# Patient Record
Sex: FEMALE | Race: OTHER | HISPANIC OR LATINO | URBAN - METROPOLITAN AREA
[De-identification: names, ages, dates, MRNs, and addresses within clinical notes are randomized per-mention and may not be internally consistent; named-entity substitution may affect disease eponyms.]

---

## 2022-05-23 ENCOUNTER — INPATIENT (INPATIENT)
Facility: HOSPITAL | Age: 27
LOS: 2 days | Discharge: HOME | End: 2022-05-26
Attending: OBSTETRICS & GYNECOLOGY | Admitting: OBSTETRICS & GYNECOLOGY

## 2022-05-23 VITALS
DIASTOLIC BLOOD PRESSURE: 69 MMHG | HEART RATE: 116 BPM | TEMPERATURE: 99 F | RESPIRATION RATE: 16 BRPM | SYSTOLIC BLOOD PRESSURE: 110 MMHG

## 2022-05-23 DIAGNOSIS — N84.0 POLYP OF CORPUS UTERI: Chronic | ICD-10-CM

## 2022-05-23 DIAGNOSIS — Z3A.00 WEEKS OF GESTATION OF PREGNANCY NOT SPECIFIED: ICD-10-CM

## 2022-05-23 DIAGNOSIS — O26.899 OTHER SPECIFIED PREGNANCY RELATED CONDITIONS, UNSPECIFIED TRIMESTER: ICD-10-CM

## 2022-05-23 DIAGNOSIS — Z98.890 OTHER SPECIFIED POSTPROCEDURAL STATES: Chronic | ICD-10-CM

## 2022-05-23 DIAGNOSIS — O41.00X0 OLIGOHYDRAMNIOS, UNSPECIFIED TRIMESTER, NOT APPLICABLE OR UNSPECIFIED: ICD-10-CM

## 2022-05-23 DIAGNOSIS — J34.2 DEVIATED NASAL SEPTUM: Chronic | ICD-10-CM

## 2022-05-23 LAB
B PERT DNA SPEC QL NAA+PROBE: SIGNIFICANT CHANGE UP
B PERT+PARAPERT DNA PNL SPEC NAA+PROBE: SIGNIFICANT CHANGE UP
BASOPHILS # BLD AUTO: 0.04 K/UL — SIGNIFICANT CHANGE UP (ref 0–0.2)
BASOPHILS NFR BLD AUTO: 0.5 % — SIGNIFICANT CHANGE UP (ref 0–2)
BLD GP AB SCN SERPL QL: NEGATIVE — SIGNIFICANT CHANGE UP
BORDETELLA PARAPERTUSSIS (RAPRVP): SIGNIFICANT CHANGE UP
C PNEUM DNA SPEC QL NAA+PROBE: SIGNIFICANT CHANGE UP
EOSINOPHIL # BLD AUTO: 0.03 K/UL — SIGNIFICANT CHANGE UP (ref 0–0.5)
EOSINOPHIL NFR BLD AUTO: 0.4 % — SIGNIFICANT CHANGE UP (ref 0–6)
FLUAV H1 2009 PAND RNA SPEC QL NAA+PROBE: DETECTED
FLUBV RNA SPEC QL NAA+PROBE: SIGNIFICANT CHANGE UP
HADV DNA SPEC QL NAA+PROBE: SIGNIFICANT CHANGE UP
HCOV 229E RNA SPEC QL NAA+PROBE: SIGNIFICANT CHANGE UP
HCOV HKU1 RNA SPEC QL NAA+PROBE: SIGNIFICANT CHANGE UP
HCOV NL63 RNA SPEC QL NAA+PROBE: SIGNIFICANT CHANGE UP
HCOV OC43 RNA SPEC QL NAA+PROBE: SIGNIFICANT CHANGE UP
HCT VFR BLD CALC: 36.6 % — SIGNIFICANT CHANGE UP (ref 34.5–45)
HGB BLD-MCNC: 11.7 G/DL — SIGNIFICANT CHANGE UP (ref 11.5–15.5)
HMPV RNA SPEC QL NAA+PROBE: SIGNIFICANT CHANGE UP
HPIV1 RNA SPEC QL NAA+PROBE: SIGNIFICANT CHANGE UP
HPIV2 RNA SPEC QL NAA+PROBE: SIGNIFICANT CHANGE UP
HPIV3 RNA SPEC QL NAA+PROBE: SIGNIFICANT CHANGE UP
HPIV4 RNA SPEC QL NAA+PROBE: SIGNIFICANT CHANGE UP
IANC: 6.21 K/UL — SIGNIFICANT CHANGE UP (ref 1.8–7.4)
IMM GRANULOCYTES NFR BLD AUTO: 1 % — SIGNIFICANT CHANGE UP (ref 0–1.5)
LYMPHOCYTES # BLD AUTO: 0.94 K/UL — LOW (ref 1–3.3)
LYMPHOCYTES # BLD AUTO: 11.5 % — LOW (ref 13–44)
M PNEUMO DNA SPEC QL NAA+PROBE: SIGNIFICANT CHANGE UP
MCHC RBC-ENTMCNC: 28.3 PG — SIGNIFICANT CHANGE UP (ref 27–34)
MCHC RBC-ENTMCNC: 32 GM/DL — SIGNIFICANT CHANGE UP (ref 32–36)
MCV RBC AUTO: 88.4 FL — SIGNIFICANT CHANGE UP (ref 80–100)
MONOCYTES # BLD AUTO: 0.9 K/UL — SIGNIFICANT CHANGE UP (ref 0–0.9)
MONOCYTES NFR BLD AUTO: 11 % — SIGNIFICANT CHANGE UP (ref 2–14)
NEUTROPHILS # BLD AUTO: 6.21 K/UL — SIGNIFICANT CHANGE UP (ref 1.8–7.4)
NEUTROPHILS NFR BLD AUTO: 75.6 % — SIGNIFICANT CHANGE UP (ref 43–77)
NRBC # BLD: 0 /100 WBCS — SIGNIFICANT CHANGE UP
NRBC # FLD: 0 K/UL — SIGNIFICANT CHANGE UP
PLATELET # BLD AUTO: 179 K/UL — SIGNIFICANT CHANGE UP (ref 150–400)
RAPID RVP RESULT: DETECTED
RBC # BLD: 4.14 M/UL — SIGNIFICANT CHANGE UP (ref 3.8–5.2)
RBC # FLD: 14.8 % — HIGH (ref 10.3–14.5)
RH IG SCN BLD-IMP: POSITIVE — SIGNIFICANT CHANGE UP
RH IG SCN BLD-IMP: POSITIVE — SIGNIFICANT CHANGE UP
RSV RNA SPEC QL NAA+PROBE: SIGNIFICANT CHANGE UP
RV+EV RNA SPEC QL NAA+PROBE: SIGNIFICANT CHANGE UP
SARS-COV-2 RNA SPEC QL NAA+PROBE: SIGNIFICANT CHANGE UP
WBC # BLD: 8.2 K/UL — SIGNIFICANT CHANGE UP (ref 3.8–10.5)
WBC # FLD AUTO: 8.2 K/UL — SIGNIFICANT CHANGE UP (ref 3.8–10.5)

## 2022-05-23 RX ORDER — SODIUM CHLORIDE 9 MG/ML
1000 INJECTION, SOLUTION INTRAVENOUS
Refills: 0 | Status: DISCONTINUED | OUTPATIENT
Start: 2022-05-23 | End: 2022-05-24

## 2022-05-23 RX ORDER — OXYTOCIN 10 UNIT/ML
333.33 VIAL (ML) INJECTION
Qty: 20 | Refills: 0 | Status: DISCONTINUED | OUTPATIENT
Start: 2022-05-23 | End: 2022-05-24

## 2022-05-23 RX ADMIN — SODIUM CHLORIDE 125 MILLILITER(S): 9 INJECTION, SOLUTION INTRAVENOUS at 20:17

## 2022-05-23 NOTE — OB PROVIDER H&P - ASSESSMENT
26 year old female P0 at 40 weeks gestation  OLIGO  SIMONA 3.4 for IOL with Buccal Cytotec    c/o COVID 19 s/s  ( body aches fever)  for r/o Covid   RVP sent     case d/w Dr Sondra Max PA  26 year old female P0 at 40 weeks gestation  OLIGO  SIMONA 3.4 for IOL with Buccal Cytotec    c/o COVID 19 s/s  ( body aches fever)  for r/o Covid   RVP sent.      case d/w Dr Sondra Max PA

## 2022-05-23 NOTE — OB PROVIDER TRIAGE NOTE - NSHPPHYSICALEXAM_GEN_ALL_CORE
pt seen and examined    ICU Vital Signs Last 24 Hrs  T(C): 37.0 (23 May 2022 17:56), Max: 37 (23 May 2022 17:34)  T(F): 98.6 (23 May 2022 17:56), Max: 98.6 (23 May 2022 17:34)  HR: 108 (23 May 2022 18:28) (103 - 119)  BP: 110/70 (23 May 2022 18:16) (110/69 - 110/70)  BP(mean): --  ABP: --  ABP(mean): --  RR: 16 (23 May 2022 17:34) (16 - 16)  SpO2: 98% (23 May 2022 18:28) (93% - 98%)  pt alert OX3   lungs clear   heart s1 s2   abd soft  gravid nontender   placed on EFM

## 2022-05-23 NOTE — OB RN PATIENT PROFILE - NS_RELATIONSHIPOFSUPPORTPERSON_OBGYN_ALL_OB_FT
Partner Pt reports pain and stiffness in his legs and back. He feels his pain is controlled with Tylenol and Motrin. He was advised not to take meloxicam if taking Motrin; he stated he was aware. He reports his stiffness worsens throughout the day. He has difficulty getting off the sofa and needs assistance. He has difficulty getting in and out of the car. Please advise. Pharmacy verified. Tasked to on call provider.

## 2022-05-23 NOTE — OB RN PATIENT PROFILE - SPIRITUAL CULTURAL, CURRENT SITUATION, PROFILE
Writer spoke with Sandy Mcgill again at Dr. Steph Juarez and she states she will send the lab orders in his paperwork packet and he can have them done here at [facility] then fax results to Yas fax [phone]. No

## 2022-05-23 NOTE — OB PROVIDER H&P - HISTORY OF PRESENT ILLNESS
26 year old female P0 at 40 weeks gestation who started feeling body aches fever 100.8 at 2p at home  and mild cramping  took Tylenol 100mgs at 2p    denied any LOF VB  feels gfm    denied any ap issues denied any fetal issues     med hx anemia  surghx Deviated septum repair  2013              L bunionectomy 2015                uterine poly removed  2015   meds pnvqd   allergies  NKDA   OB hx  P0   gyn hx denied        26 year old female P0 at 40 weeks gestation who started feeling body aches fever 100.8 at 2p at home  and mild cramping  took Tylenol 100mgs at 2p    denied any LOF VB  feels gfm    denied any ap issues denied any fetal issues   stated was vaccinated  no sick contacts          med hx anemia  surghx Deviated septum repair  2013              L bunionectomy 2015                uterine poly removed  2015   meds pnvqd   allergies  NKDA   OB hx  P0   gyn hx denied

## 2022-05-23 NOTE — OB RN TRIAGE NOTE - FALL HARM RISK - UNIVERSAL INTERVENTIONS
Bed in lowest position, wheels locked, appropriate side rails in place/Call bell, personal items and telephone in reach/Instruct patient to call for assistance before getting out of bed or chair/Non-slip footwear when patient is out of bed/[place] to call system/Physically safe environment - no spills, clutter or unnecessary equipment/Purposeful Proactive Rounding/Room/bathroom lighting operational, light cord in reach

## 2022-05-23 NOTE — OB RN TRIAGE NOTE - NSICDXPASTSURGICALHX_GEN_ALL_CORE_FT
PAST SURGICAL HISTORY:  Deviated septum 2013    History of bunionectomy 2015 left    Uterine polyp removed 2015

## 2022-05-23 NOTE — OB RN PATIENT PROFILE - FALL HARM RISK - UNIVERSAL INTERVENTIONS
Bed in lowest position, wheels locked, appropriate side rails in place/Call bell, personal items and telephone in reach/Instruct patient to call for assistance before getting out of bed or chair/Non-slip footwear when patient is out of bed/[place] to call system/Physically safe environment - no spills, clutter or unnecessary equipment/Purposeful Proactive Rounding/Room/bathroom lighting operational, light cord in reach

## 2022-05-23 NOTE — OB PROVIDER TRIAGE NOTE - HISTORY OF PRESENT ILLNESS
26 year old female P0 at 40 weeks gestation who started feeling body aches fever 100.8 at 2p at home  and mild cramping  took Tylenol 100mgs at 2p    denied any LOF VB  feels gfm    denied any ap issues denied any fetal issues     med hx anemia  surghx Deviated septum repair  2013              L bunionectomy 2015                uterine poly removed  2015   meds pnvqd   allergies  NKDA   OB hx  P0   gyn hx denied

## 2022-05-23 NOTE — OB PROVIDER TRIAGE NOTE - NSOBPROVIDERNOTE_OBGYN_ALL_OB_FT
26 year old female P0 at 40 weeks  Covid s/s   r/o Covid 26 year old female P0 at 40 weeks  OLIGO   SIMONA 3.4   for IOL with buccal Cytotec    c/o COVID  s/s ( body aches fever     case d/w Dr carver    admitted   COVID 19  RVP done 26 year old female P0 at 40 weeks  OLIGO   SIMONA 3.4   for IOL with buccal Cytotec    c/o COVID  s/s ( body aches fever )  r/o  COVID  19      case d/w Dr carver    admitted   COVID 19  RVP done    Mansoor SANCHEZ

## 2022-05-24 ENCOUNTER — TRANSCRIPTION ENCOUNTER (OUTPATIENT)
Age: 27
End: 2022-05-24

## 2022-05-24 DIAGNOSIS — O41.00X0 OLIGOHYDRAMNIOS, UNSPECIFIED TRIMESTER, NOT APPLICABLE OR UNSPECIFIED: ICD-10-CM

## 2022-05-24 LAB
COVID-19 SPIKE DOMAIN AB INTERP: POSITIVE
COVID-19 SPIKE DOMAIN ANTIBODY RESULT: >250 U/ML — HIGH
SARS-COV-2 IGG+IGM SERPL QL IA: >250 U/ML — HIGH
SARS-COV-2 IGG+IGM SERPL QL IA: POSITIVE
T PALLIDUM AB TITR SER: NEGATIVE — SIGNIFICANT CHANGE UP

## 2022-05-24 RX ORDER — DIPHENHYDRAMINE HCL 50 MG
25 CAPSULE ORAL EVERY 6 HOURS
Refills: 0 | Status: DISCONTINUED | OUTPATIENT
Start: 2022-05-24 | End: 2022-05-26

## 2022-05-24 RX ORDER — ACETAMINOPHEN 500 MG
975 TABLET ORAL
Refills: 0 | Status: DISCONTINUED | OUTPATIENT
Start: 2022-05-24 | End: 2022-05-26

## 2022-05-24 RX ORDER — IBUPROFEN 200 MG
600 TABLET ORAL EVERY 6 HOURS
Refills: 0 | Status: COMPLETED | OUTPATIENT
Start: 2022-05-24 | End: 2023-04-22

## 2022-05-24 RX ORDER — BENZOCAINE 10 %
1 GEL (GRAM) MUCOUS MEMBRANE EVERY 6 HOURS
Refills: 0 | Status: DISCONTINUED | OUTPATIENT
Start: 2022-05-24 | End: 2022-05-26

## 2022-05-24 RX ORDER — DIBUCAINE 1 %
1 OINTMENT (GRAM) RECTAL EVERY 6 HOURS
Refills: 0 | Status: DISCONTINUED | OUTPATIENT
Start: 2022-05-24 | End: 2022-05-26

## 2022-05-24 RX ORDER — SIMETHICONE 80 MG/1
80 TABLET, CHEWABLE ORAL EVERY 4 HOURS
Refills: 0 | Status: DISCONTINUED | OUTPATIENT
Start: 2022-05-24 | End: 2022-05-26

## 2022-05-24 RX ORDER — SODIUM CHLORIDE 9 MG/ML
3 INJECTION INTRAMUSCULAR; INTRAVENOUS; SUBCUTANEOUS EVERY 8 HOURS
Refills: 0 | Status: DISCONTINUED | OUTPATIENT
Start: 2022-05-24 | End: 2022-05-26

## 2022-05-24 RX ORDER — OXYTOCIN 10 UNIT/ML
2 VIAL (ML) INJECTION
Qty: 30 | Refills: 0 | Status: DISCONTINUED | OUTPATIENT
Start: 2022-05-24 | End: 2022-05-26

## 2022-05-24 RX ORDER — LANOLIN
1 OINTMENT (GRAM) TOPICAL EVERY 6 HOURS
Refills: 0 | Status: DISCONTINUED | OUTPATIENT
Start: 2022-05-24 | End: 2022-05-26

## 2022-05-24 RX ORDER — MAGNESIUM HYDROXIDE 400 MG/1
30 TABLET, CHEWABLE ORAL
Refills: 0 | Status: DISCONTINUED | OUTPATIENT
Start: 2022-05-24 | End: 2022-05-26

## 2022-05-24 RX ORDER — OXYCODONE HYDROCHLORIDE 5 MG/1
5 TABLET ORAL
Refills: 0 | Status: DISCONTINUED | OUTPATIENT
Start: 2022-05-24 | End: 2022-05-26

## 2022-05-24 RX ORDER — OXYTOCIN 10 UNIT/ML
333.33 VIAL (ML) INJECTION
Qty: 20 | Refills: 0 | Status: DISCONTINUED | OUTPATIENT
Start: 2022-05-24 | End: 2022-05-26

## 2022-05-24 RX ORDER — OXYCODONE HYDROCHLORIDE 5 MG/1
5 TABLET ORAL ONCE
Refills: 0 | Status: DISCONTINUED | OUTPATIENT
Start: 2022-05-24 | End: 2022-05-26

## 2022-05-24 RX ORDER — TETANUS TOXOID, REDUCED DIPHTHERIA TOXOID AND ACELLULAR PERTUSSIS VACCINE, ADSORBED 5; 2.5; 8; 8; 2.5 [IU]/.5ML; [IU]/.5ML; UG/.5ML; UG/.5ML; UG/.5ML
0.5 SUSPENSION INTRAMUSCULAR ONCE
Refills: 0 | Status: DISCONTINUED | OUTPATIENT
Start: 2022-05-24 | End: 2022-05-26

## 2022-05-24 RX ORDER — PRAMOXINE HYDROCHLORIDE 150 MG/15G
1 AEROSOL, FOAM RECTAL EVERY 4 HOURS
Refills: 0 | Status: DISCONTINUED | OUTPATIENT
Start: 2022-05-24 | End: 2022-05-26

## 2022-05-24 RX ORDER — AER TRAVELER 0.5 G/1
1 SOLUTION RECTAL; TOPICAL EVERY 4 HOURS
Refills: 0 | Status: DISCONTINUED | OUTPATIENT
Start: 2022-05-24 | End: 2022-05-26

## 2022-05-24 RX ORDER — KETOROLAC TROMETHAMINE 30 MG/ML
30 SYRINGE (ML) INJECTION ONCE
Refills: 0 | Status: DISCONTINUED | OUTPATIENT
Start: 2022-05-24 | End: 2022-05-24

## 2022-05-24 RX ORDER — BUTORPHANOL TARTRATE 2 MG/ML
2 INJECTION, SOLUTION INTRAMUSCULAR; INTRAVENOUS ONCE
Refills: 0 | Status: DISCONTINUED | OUTPATIENT
Start: 2022-05-24 | End: 2022-05-24

## 2022-05-24 RX ORDER — HYDROCORTISONE 1 %
1 OINTMENT (GRAM) TOPICAL EVERY 6 HOURS
Refills: 0 | Status: DISCONTINUED | OUTPATIENT
Start: 2022-05-24 | End: 2022-05-26

## 2022-05-24 RX ADMIN — Medication 1000 MILLIUNIT(S)/MIN: at 23:31

## 2022-05-24 RX ADMIN — Medication 30 MILLIGRAM(S): at 23:31

## 2022-05-24 RX ADMIN — Medication 75 MILLIGRAM(S): at 12:06

## 2022-05-24 RX ADMIN — Medication 2 MILLIUNIT(S)/MIN: at 17:47

## 2022-05-24 NOTE — OB PROVIDER LABOR PROGRESS NOTE - NS_OBIHIFHRDETAILS_OBGYN_ALL_OB_FT
140/min/(-)accels/(-)decels
140/mod variability/-accels/-decels
130s, moderate variability, accels, no decels
150s, moderate variability, accels, no decels

## 2022-05-24 NOTE — OB PROVIDER DELIVERY SUMMARY - NSPROVIDERDELIVERYNOTE_OBGYN_ALL_OB_FT
Attending Note   Pt progressed to 10/100/+2   Delivered vertex over intact perineum followed by the body and shoulders  Delayed cord clamping performed  Placenta delivered intact  Uterine atony noted and resolved with double pitocin and massage  Cavity noted to be empty   Vagina, rectum and cervix inspected  2nd degree laceration noted and repaired in usual fashion   rectal exam intact after delivery   Darcy Rolon MD Attending Note     Pt progressed to 10/100/+2   Delivered vertex over intact perineum followed by the body and shoulders  Delayed cord clamping performed  Placenta delivered intact  Uterine atony noted and resolved with double pitocin and massage  Cavity noted to be empty   Vagina, rectum and cervix inspected  2nd degree laceration noted and repaired in usual fashion   rectal exam intact after delivery   Girl Darcy Rolon MD

## 2022-05-24 NOTE — OB PROVIDER DELIVERY SUMMARY - NSSELHIDDEN_OBGYN_ALL_OB_FT
[NS_DeliveryAttending2_OBGYN_ALL_OB_FT:[email or web address]],[NS_DeliveryAssist1_OBGYN_ALL_OB_FT:[id number]=],[NS_DeliveryAttending1_OBGYN_ALL_OB_FT:[email or web address]=]

## 2022-05-24 NOTE — DISCHARGE NOTE OB - MEDICATION SUMMARY - MEDICATIONS TO TAKE
I will START or STAY ON the medications listed below when I get home from the hospital:    ibuprofen 600 mg oral tablet  -- 1 tab(s) by mouth every 6 hours  -- Indication: For pain    acetaminophen 325 mg oral tablet  -- 2 tab(s) by mouth every 4 hours, As Needed  -- Indication: For pain     oseltamivir 75 mg oral capsule  -- 1 cap(s) by mouth 2 times a day MDD:2  -- Indication: For flu    Prena1 oral capsule  -- 1 cap(s) by mouth once a day  -- Indication: For postpartum

## 2022-05-24 NOTE — OB PROVIDER LABOR PROGRESS NOTE - NS_SUBJECTIVE/OBJECTIVE_OBGYN_ALL_OB_FT
VE due to pt feeling increased pain and pressure
VE to evaluate progress in labor
PGY1 Labor & Delivery Progress Note     Pt examined @0907 for CRB placement    T(C): 37.0 (05-24-22 @ 04:00), Max: 37.3 (05-23-22 @ 22:00)  HR: 98 (05-24-22 @ 09:24) (81 - 125)  BP: 115/73 (05-24-22 @ 08:02) (99/58 - 140/82)  RR: 14 (05-24-22 @ 04:00) (14 - 16)  SpO2: 98% (05-24-22 @ 09:24) (92% - 100%)
pt seen and examined at bedside

## 2022-05-24 NOTE — OB PROVIDER LABOR PROGRESS NOTE - ASSESSMENT
25 y/o  at 40/1 IOL for oligo SIMONA 4  - s/p cervical balloon   - plan for pitocin when able  - cont with EFM/toco  - d/w DR Alvarado- [place]

## 2022-05-24 NOTE — DISCHARGE NOTE OB - PATIENT PORTAL LINK FT
You can access the FollowMyHealth Patient Portal offered by [facility] by registering at the following website: [email or web address]. By joining [facility]’s FollowMyHealth portal, you will also be able to view your health information using other applications (apps) compatible with our system.

## 2022-05-24 NOTE — OB PROVIDER LABOR PROGRESS NOTE - ASSESSMENT
01/18/17 1200   Referral Source   Referral Source High dollar   Plan   Plan Epic chart reviewed   Case Status   Case Status Closed - no opportunity   Chart reviewed by Onsite Nurse  for high dollar intervention initiative.   PLAN:  Will not reach out to patient based on Epic chart review.  Patient is receiving the necessary care and has care established with the appropriate providers.   Patient has established care with a PCP.  Sarah Forte, RN, BS, [facility]   Jackie Onsite Nurse   Jackie Caregivers for last names A - D  T [phone]   - c/w buccal cytotec    Pascale Watkins, PGY1  d/w Dr. Amaro

## 2022-05-24 NOTE — OB RN DELIVERY SUMMARY - NSSELHIDDEN_OBGYN_ALL_OB_FT
[NS_DeliveryAttending1_OBGYN_ALL_OB_FT:[email or web address]=],[NS_DeliveryAssist1_OBGYN_ALL_OB_FT:[id number]=],[NS_DeliveryRN_OBGYN_ALL_OB_FT:[id number]=]

## 2022-05-24 NOTE — OB RN DELIVERY SUMMARY - NS_SEPSISRSKCALC_OBGYN_ALL_OB_FT
EOS calculated successfully. EOS Risk Factor: 0.5/1000 live births ([facility] national incidence); GA=40w1d; Temp=99.14; ROM=1.7; GBS='Negative'; Antibiotics='No antibiotics or any antibiotics < 2 hrs prior to birth'

## 2022-05-24 NOTE — OB PROVIDER LABOR PROGRESS NOTE - ASSESSMENT
- c/w pitocin   - anesthesia called for top off   - anticipate     Pascale Watkins, PGY1  d/w Dr. Christiano Stephens  - c/w pitocin   - anesthesia called for top off   - anticipate     Pascale Watkins, PGY1  d/w Dr. Christiano Stephens     Attending Note   Pt seen and examined   agree with above     R Gonzales LEVI

## 2022-05-24 NOTE — OB PROVIDER LABOR PROGRESS NOTE - ASSESSMENT
A/P:   26y  @ 40.1wga admitted for IOL 2/2 to Oligo    #Labor   - c/w buccal Misoprostol  - CRB placed. Patient tolerated the procedure well. Vaginal and uterine balloons were filled with 60cc of NS each     #Fetal Wellbeing   - Cat 1    # Issues   - To start Tamiflu for Influenza (+)    #Pain Control   - Epidural, IV, and/or PO PRN    Sonu Khanna, PGY-1    d/w Dr. Amaro

## 2022-05-24 NOTE — DISCHARGE NOTE OB - CARE PLAN
Principal Discharge DX:	Normal vaginal delivery  Assessment and plan of treatment:	nothing in the vagina x 6 weeks  Secondary Diagnosis:	Influenza  Assessment and plan of treatment:	continue tamiflu   1

## 2022-05-24 NOTE — DISCHARGE NOTE OB - MATERIALS PROVIDED
Vaccinations/[facility]  Screening Program/  Immunization Record/Breastfeeding Log/Breastfeeding Mother’s Support Group Information/Guide to Postpartum Care/[facility] Hearing Screen Program/Back To Sleep Handout/Shaken Baby Prevention Handout/Breastfeeding Guide and Packet/Birth Certificate Instructions/Discharge Medication Information for Patients and Families Pocket Guide

## 2022-05-24 NOTE — DISCHARGE NOTE OB - CARE PROVIDER_API CALL
Latoya Amaro)  [place] [address]  Phone: [phone]  Fax: [phone]  Follow Up Time:

## 2022-05-24 NOTE — DISCHARGE NOTE OB - NS MD DC FALL RISK RISK
For information on Fall & Injury Prevention, visit: [email or web address] OR  [email or web address] OR  https://www.cdc.gov/steadi/patient.html

## 2022-05-25 RX ORDER — SODIUM CHLORIDE 9 MG/ML
500 INJECTION, SOLUTION INTRAVENOUS ONCE
Refills: 0 | Status: COMPLETED | OUTPATIENT
Start: 2022-05-25 | End: 2022-05-25

## 2022-05-25 RX ORDER — IBUPROFEN 200 MG
600 TABLET ORAL EVERY 6 HOURS
Refills: 0 | Status: DISCONTINUED | OUTPATIENT
Start: 2022-05-25 | End: 2022-05-26

## 2022-05-25 RX ORDER — SODIUM CHLORIDE 9 MG/ML
1000 INJECTION, SOLUTION INTRAVENOUS ONCE
Refills: 0 | Status: COMPLETED | OUTPATIENT
Start: 2022-05-25 | End: 2022-05-25

## 2022-05-25 RX ADMIN — Medication 600 MILLIGRAM(S): at 22:55

## 2022-05-25 RX ADMIN — Medication 30 MILLIGRAM(S): at 00:14

## 2022-05-25 RX ADMIN — SODIUM CHLORIDE 3 MILLILITER(S): 9 INJECTION INTRAMUSCULAR; INTRAVENOUS; SUBCUTANEOUS at 06:33

## 2022-05-25 RX ADMIN — Medication 75 MILLIGRAM(S): at 18:54

## 2022-05-25 RX ADMIN — Medication 600 MILLIGRAM(S): at 22:08

## 2022-05-25 RX ADMIN — Medication 975 MILLIGRAM(S): at 18:34

## 2022-05-25 RX ADMIN — MAGNESIUM HYDROXIDE 30 MILLILITER(S): 400 TABLET, CHEWABLE ORAL at 23:24

## 2022-05-25 RX ADMIN — Medication 600 MILLIGRAM(S): at 13:55

## 2022-05-25 RX ADMIN — Medication 975 MILLIGRAM(S): at 02:21

## 2022-05-25 RX ADMIN — Medication 975 MILLIGRAM(S): at 02:59

## 2022-05-25 RX ADMIN — Medication 75 MILLIGRAM(S): at 05:53

## 2022-05-25 RX ADMIN — SODIUM CHLORIDE 3 MILLILITER(S): 9 INJECTION INTRAMUSCULAR; INTRAVENOUS; SUBCUTANEOUS at 13:42

## 2022-05-25 RX ADMIN — SODIUM CHLORIDE 1000 MILLILITER(S): 9 INJECTION, SOLUTION INTRAVENOUS at 06:33

## 2022-05-25 RX ADMIN — SODIUM CHLORIDE 2000 MILLILITER(S): 9 INJECTION, SOLUTION INTRAVENOUS at 00:05

## 2022-05-25 RX ADMIN — Medication 600 MILLIGRAM(S): at 13:09

## 2022-05-25 RX ADMIN — SIMETHICONE 80 MILLIGRAM(S): 80 TABLET, CHEWABLE ORAL at 23:25

## 2022-05-25 RX ADMIN — Medication 600 MILLIGRAM(S): at 05:53

## 2022-05-25 RX ADMIN — Medication 600 MILLIGRAM(S): at 06:40

## 2022-05-25 RX ADMIN — Medication 975 MILLIGRAM(S): at 19:20

## 2022-05-25 NOTE — PROVIDER CONTACT NOTE (OTHER) - ASSESSMENT
133/75 HR  95  123/76   129/83   Patient reports feeling lightheaded upon standing   Fundus firm and at umbilicus with light bleeding

## 2022-05-25 NOTE — PROVIDER CONTACT NOTE (OTHER) - SITUATION
patient positive for orthostatics. laying 118/78 HR 77 standing 91/60 
MD Notified for patient feeling lightheaded when standing after ortho BP

## 2022-05-25 NOTE — PROVIDER CONTACT NOTE (OTHER) - ACTION/TREATMENT ORDERED:
1L bolus to be given
500cc LR bolus to run over 30 min, encourage oral fluids and repeat orthos in AM.

## 2022-05-25 NOTE — PROVIDER CONTACT NOTE (OTHER) - ASSESSMENT
patient states that she feels lightheaded when standing. denies dizziness, headache, heart palpitations, or vision changes. patient also denies n/v. states that she feels tired and hasn't slept since monday.

## 2022-05-25 NOTE — PROGRESS NOTE ADULT - ASSESSMENT
POD #1 s/p vaginal delivery. Patient is doing well, ambulating, voiding, tolerating diet. Denies nausea and vomiting. Pain is tolerable. No residual anesthetic issues or complications noted.     Blanca Hammond CRNA

## 2022-05-26 VITALS
RESPIRATION RATE: 18 BRPM | DIASTOLIC BLOOD PRESSURE: 63 MMHG | SYSTOLIC BLOOD PRESSURE: 120 MMHG | HEART RATE: 91 BPM | OXYGEN SATURATION: 100 % | TEMPERATURE: 98 F

## 2022-05-26 RX ORDER — ACETAMINOPHEN 500 MG
3 TABLET ORAL
Qty: 0 | Refills: 0 | DISCHARGE
Start: 2022-05-26

## 2022-05-26 RX ORDER — ACETAMINOPHEN 500 MG
2 TABLET ORAL
Qty: 0 | Refills: 0 | DISCHARGE
Start: 2022-05-26

## 2022-05-26 RX ORDER — IBUPROFEN 200 MG
1 TABLET ORAL
Qty: 0 | Refills: 0 | DISCHARGE
Start: 2022-05-26

## 2022-05-26 RX ADMIN — Medication 75 MILLIGRAM(S): at 05:24

## 2022-05-26 RX ADMIN — Medication 600 MILLIGRAM(S): at 05:24

## 2022-05-26 RX ADMIN — Medication 600 MILLIGRAM(S): at 06:01

## 2022-05-26 RX ADMIN — Medication 600 MILLIGRAM(S): at 11:55

## 2022-05-26 RX ADMIN — Medication 600 MILLIGRAM(S): at 12:20

## 2023-08-11 NOTE — OB PROVIDER H&P - NSHPPHYSICALEXAM_GEN_ALL_CORE
- - - pt seen and examined   ICU Vital Signs Last 24 Hrs  T(C): 37.0 (23 May 2022 17:56), Max: 37 (23 May 2022 17:34)  T(F): 98.6 (23 May 2022 17:56), Max: 98.6 (23 May 2022 17:34)  HR: 119 (23 May 2022 19:08) (103 - 123)  BP: 130/82 (23 May 2022 18:56) (105/61 - 130/82)  BP(mean): --  ABP: --  ABP(mean): --  RR: 16 (23 May 2022 17:34) (16 - 16)  SpO2: 96% (23 May 2022 19:08) (93% - 98%)  pt alert OX3   lungs clear   heart s1 s2   abd soft  VE 2/50/-3  vtx   scan vertex SIMONA 3.4 pt seen and examined   ICU Vital Signs Last 24 Hrs  T(C): 37.0 (23 May 2022 17:56), Max: 37 (23 May 2022 17:34)  T(F): 98.6 (23 May 2022 17:56), Max: 98.6 (23 May 2022 17:34)  HR: 119 (23 May 2022 19:08) (103 - 123)  BP: 130/82 (23 May 2022 18:56) (105/61 - 130/82)  RR: 16 (23 May 2022 17:34) (16 - 16)  SpO2: 96% (23 May 2022 19:08) (93% - 98%)  pt alert OX3   lungs clear   heart s1 s2   abd soft  VE 2/50/-3  vtx   Nitz negative   scan vertex SIMONA 3.4 BPP 6/8 posterior placenta

## 2024-02-20 PROBLEM — D64.9 ANEMIA, UNSPECIFIED: Chronic | Status: ACTIVE | Noted: 2022-05-23

## 2024-02-26 PROBLEM — Z00.00 ENCOUNTER FOR PREVENTIVE HEALTH EXAMINATION: Status: ACTIVE | Noted: 2024-02-26

## 2024-03-05 ENCOUNTER — ASOB RESULT (OUTPATIENT)
Age: 29
End: 2024-03-05

## 2024-03-05 ENCOUNTER — APPOINTMENT (OUTPATIENT)
Dept: ANTEPARTUM | Facility: CLINIC | Age: 29
End: 2024-03-05
Payer: COMMERCIAL

## 2024-03-05 PROCEDURE — 76801 OB US < 14 WKS SINGLE FETUS: CPT | Mod: 59

## 2024-03-05 PROCEDURE — 76813 OB US NUCHAL MEAS 1 GEST: CPT

## 2024-03-08 NOTE — OB RN PATIENT PROFILE - NSCTXPAIN_OBGYN_ALL_OB
This patient has been assessed with a concern for Malnutrition and has been determined to have a diagnosis/diagnoses of Severe protein-calorie malnutrition.    This patient is being managed with:   Diet NPO after Midnight-     NPO Start Date: 03-Mar-2024   NPO Start Time: 23:59  Entered: Mar  3 2024  7:45AM    Diet Consistent Carbohydrate/No Snacks-  Supplement Feeding Modality:  Oral  Ensure Max Cans or Servings Per Day:  1       Frequency:  Two Times a day  Entered: Mar  2 2024  4:30PM   This patient has been assessed with a concern for Malnutrition and has been determined to have a diagnosis/diagnoses of Severe protein-calorie malnutrition.    This patient is being managed with:   Diet Consistent Carbohydrate/No Snacks-  Supplement Feeding Modality:  Oral  Ensure Max Cans or Servings Per Day:  1       Frequency:  Two Times a day  Entered: Mar  2 2024  4:30PM   This patient has been assessed with a concern for Malnutrition and has been determined to have a diagnosis/diagnoses of Severe protein-calorie malnutrition.    This patient is being managed with:   Diet Consistent Carbohydrate/No Snacks-  Liquid Protein Supplement     Qty per Day:  2  Entered: Mar  6 2024  1:27PM   This patient has been assessed with a concern for Malnutrition and has been determined to have a diagnosis/diagnoses of Severe protein-calorie malnutrition.    This patient is being managed with:   Diet Consistent Carbohydrate/No Snacks-  Supplement Feeding Modality:  Oral  Ensure Max Cans or Servings Per Day:  1       Frequency:  Two Times a day  Entered: Mar  4 2024 11:29AM   This patient has been assessed with a concern for Malnutrition and has been determined to have a diagnosis/diagnoses of Severe protein-calorie malnutrition.    This patient is being managed with:   Diet Consistent Carbohydrate/No Snacks-  Liquid Protein Supplement     Qty per Day:  2  Entered: Mar  6 2024  1:27PM   This patient has been assessed with a concern for Malnutrition and has been determined to have a diagnosis/diagnoses of Severe protein-calorie malnutrition.    This patient is being managed with:   Diet Consistent Carbohydrate/No Snacks-  Supplement Feeding Modality:  Oral  Ensure Max Cans or Servings Per Day:  1       Frequency:  Two Times a day  Entered: Mar  4 2024 11:29AM   Yes

## 2024-03-13 ENCOUNTER — TRANSCRIPTION ENCOUNTER (OUTPATIENT)
Age: 29
End: 2024-03-13

## 2024-03-29 ENCOUNTER — APPOINTMENT (OUTPATIENT)
Dept: ULTRASOUND IMAGING | Facility: CLINIC | Age: 29
End: 2024-03-29

## 2024-04-23 ENCOUNTER — OUTPATIENT (OUTPATIENT)
Dept: OUTPATIENT SERVICES | Facility: HOSPITAL | Age: 29
LOS: 1 days | End: 2024-04-23
Payer: COMMERCIAL

## 2024-04-23 ENCOUNTER — APPOINTMENT (OUTPATIENT)
Dept: ULTRASOUND IMAGING | Facility: CLINIC | Age: 29
End: 2024-04-23
Payer: COMMERCIAL

## 2024-04-23 DIAGNOSIS — Z00.8 ENCOUNTER FOR OTHER GENERAL EXAMINATION: ICD-10-CM

## 2024-04-23 DIAGNOSIS — N84.0 POLYP OF CORPUS UTERI: Chronic | ICD-10-CM

## 2024-04-23 DIAGNOSIS — Z98.890 OTHER SPECIFIED POSTPROCEDURAL STATES: Chronic | ICD-10-CM

## 2024-04-23 DIAGNOSIS — J34.2 DEVIATED NASAL SEPTUM: Chronic | ICD-10-CM

## 2024-04-23 PROCEDURE — 76642 ULTRASOUND BREAST LIMITED: CPT | Mod: 26,LT

## 2024-04-29 ENCOUNTER — APPOINTMENT (OUTPATIENT)
Dept: ANTEPARTUM | Facility: CLINIC | Age: 29
End: 2024-04-29
Payer: COMMERCIAL

## 2024-04-29 ENCOUNTER — ASOB RESULT (OUTPATIENT)
Age: 29
End: 2024-04-29

## 2024-04-29 PROCEDURE — 76811 OB US DETAILED SNGL FETUS: CPT

## 2024-08-22 ENCOUNTER — ASOB RESULT (OUTPATIENT)
Age: 29
End: 2024-08-22

## 2024-08-22 ENCOUNTER — APPOINTMENT (OUTPATIENT)
Dept: ANTEPARTUM | Facility: CLINIC | Age: 29
End: 2024-08-22

## 2024-08-22 PROCEDURE — 76816 OB US FOLLOW-UP PER FETUS: CPT

## 2024-08-22 PROCEDURE — 76819 FETAL BIOPHYS PROFIL W/O NST: CPT | Mod: 59

## 2024-08-22 PROCEDURE — 99202 OFFICE O/P NEW SF 15 MIN: CPT | Mod: 25

## 2024-09-05 ENCOUNTER — INPATIENT (INPATIENT)
Facility: HOSPITAL | Age: 29
LOS: 2 days | Discharge: HOME | End: 2024-09-08
Attending: OBSTETRICS & GYNECOLOGY | Admitting: OBSTETRICS & GYNECOLOGY

## 2024-09-05 VITALS — OXYGEN SATURATION: 98 % | HEART RATE: 80 BPM

## 2024-09-05 DIAGNOSIS — N84.0 POLYP OF CORPUS UTERI: Chronic | ICD-10-CM

## 2024-09-05 DIAGNOSIS — J34.2 DEVIATED NASAL SEPTUM: Chronic | ICD-10-CM

## 2024-09-05 DIAGNOSIS — Z98.890 OTHER SPECIFIED POSTPROCEDURAL STATES: Chronic | ICD-10-CM

## 2024-09-05 DIAGNOSIS — O32.0XX0 MATERNAL CARE FOR UNSTABLE LIE, NOT APPLICABLE OR UNSPECIFIED: ICD-10-CM

## 2024-09-05 LAB
BASOPHILS # BLD AUTO: 0.03 K/UL — SIGNIFICANT CHANGE UP (ref 0–0.2)
BASOPHILS NFR BLD AUTO: 0.5 % — SIGNIFICANT CHANGE UP (ref 0–2)
BLD GP AB SCN SERPL QL: NEGATIVE — SIGNIFICANT CHANGE UP
EOSINOPHIL # BLD AUTO: 0.06 K/UL — SIGNIFICANT CHANGE UP (ref 0–0.5)
EOSINOPHIL NFR BLD AUTO: 0.9 % — SIGNIFICANT CHANGE UP (ref 0–6)
HCT VFR BLD CALC: 31.3 % — LOW (ref 34.5–45)
HGB BLD-MCNC: 9.8 G/DL — LOW (ref 11.5–15.5)
IANC: 4.12 K/UL — SIGNIFICANT CHANGE UP (ref 1.8–7.4)
IMM GRANULOCYTES NFR BLD AUTO: 0.3 % — SIGNIFICANT CHANGE UP (ref 0–0.9)
LYMPHOCYTES # BLD AUTO: 1.63 K/UL — SIGNIFICANT CHANGE UP (ref 1–3.3)
LYMPHOCYTES # BLD AUTO: 25.3 % — SIGNIFICANT CHANGE UP (ref 13–44)
MCHC RBC-ENTMCNC: 25.7 PG — LOW (ref 27–34)
MCHC RBC-ENTMCNC: 31.3 GM/DL — LOW (ref 32–36)
MCV RBC AUTO: 82.2 FL — SIGNIFICANT CHANGE UP (ref 80–100)
MONOCYTES # BLD AUTO: 0.58 K/UL — SIGNIFICANT CHANGE UP (ref 0–0.9)
MONOCYTES NFR BLD AUTO: 9 % — SIGNIFICANT CHANGE UP (ref 2–14)
NEUTROPHILS # BLD AUTO: 4.12 K/UL — SIGNIFICANT CHANGE UP (ref 1.8–7.4)
NEUTROPHILS NFR BLD AUTO: 64 % — SIGNIFICANT CHANGE UP (ref 43–77)
NRBC # BLD: 0 /100 WBCS — SIGNIFICANT CHANGE UP (ref 0–0)
NRBC # FLD: 0 K/UL — SIGNIFICANT CHANGE UP (ref 0–0)
PLATELET # BLD AUTO: 187 K/UL — SIGNIFICANT CHANGE UP (ref 150–400)
RBC # BLD: 3.81 M/UL — SIGNIFICANT CHANGE UP (ref 3.8–5.2)
RBC # FLD: 15.4 % — HIGH (ref 10.3–14.5)
RH IG SCN BLD-IMP: POSITIVE — SIGNIFICANT CHANGE UP
WBC # BLD: 6.44 K/UL — SIGNIFICANT CHANGE UP (ref 3.8–10.5)
WBC # FLD AUTO: 6.44 K/UL — SIGNIFICANT CHANGE UP (ref 3.8–10.5)

## 2024-09-05 RX ORDER — OXYTOCIN 10 UNIT/ML
333.33 AMPUL (ML) INJECTION
Qty: 20 | Refills: 0 | Status: DISCONTINUED | OUTPATIENT
Start: 2024-09-05 | End: 2024-09-07

## 2024-09-05 RX ORDER — SODIUM CITRATE AND CITRIC ACID MONOHYDRATE 334; 500 MG/5ML; MG/5ML
15 SOLUTION ORAL EVERY 6 HOURS
Refills: 0 | Status: DISCONTINUED | OUTPATIENT
Start: 2024-09-05 | End: 2024-09-06

## 2024-09-05 RX ORDER — CHLORHEXIDINE GLUCONATE 40 MG/ML
1 SOLUTION TOPICAL DAILY
Refills: 0 | Status: DISCONTINUED | OUTPATIENT
Start: 2024-09-05 | End: 2024-09-06

## 2024-09-05 RX ADMIN — Medication 125 MILLILITER(S): at 21:29

## 2024-09-05 NOTE — OB PROVIDER H&P - NSHPPHYSICALEXAM_GEN_ALL_CORE
Objective  – VS  T(C): 36.8 (09-05-24 @ 19:45)  HR: 91 (09-05-24 @ 20:31)  BP: 132/70 (09-05-24 @ 20:31)  RR: 16 (09-05-24 @ 19:45)  SpO2: 98% (09-05-24 @ 20:25)    Physical Exam  CV: RRR  Pulm: breathing comfortably on RA  Abd: gravid, nontender  Extr: moving all extremities with ease  – VE: 1.5/50/-3  – FHT: baseline 140, mod variability, +accels, -decels  – [place]: q5-10min  – EFW: 3800g extrapolated from 8/22 sono.  – Sono: vertex

## 2024-09-05 NOTE — OB PROVIDER H&P - HISTORY OF PRESENT ILLNESS
*INCOMPLETE NOTE*    R1 Admission H&P    Subjective  HPI: 28y  at 39w0d presents for rrIOL for unstable lie. +FM. -LOF. -CTXs. -VB. Pt denies any other concerns. Patient had ECV scheduled for transverse lie of fetus at 37 weeks, found to be cephalic at that time.     – PNC: Denies prenatal issues. GBS neg.  EFW 3800g extrapolated from  sono.  – OBHx:        -   7#9  – GynHx: denies fibroids, cysts, endometriosis, abnormal pap smears, STIs  – PMH: denies  – PSH:        - uterine polypectomy        - L bunionectomy        - septoplasty   – Psych: PPD after prior delivery, was in therapy no meds   – Social: denies   – Meds: PNV   – Allergies: NKDA  – Will accept blood transfusions? Yes     R1 Admission H&P    Subjective  HPI: 28y  at 39w0d presents for rrIOL for unstable lie. +FM. -LOF. -CTXs. -VB. Pt denies any other concerns. Patient had ECV scheduled for transverse lie of fetus at 37 weeks, found to be cephalic at that time.     – PNC: Denies prenatal issues. GBS neg.  EFW 3800g extrapolated from  sono.  – OBHx:        -   7#9  – GynHx: denies fibroids, cysts, endometriosis, abnormal pap smears, STIs  – PMH: denies  – PSH:        - uterine polypectomy        - L bunionectomy        - septoplasty   – Psych: PPD after prior delivery, was in therapy no meds   – Social: denies   – Meds: PNV   – Allergies: NKDA  – Will accept blood transfusions? Yes

## 2024-09-05 NOTE — OB PROVIDER H&P - ASSESSMENT
Assessment  28y  at 39w0d presents for rrIOL for unstable lie.     Plan  1. Admit to L+D. Routine Labs. IVF.  2. IOL with   3. Fetus: cat 1 tracing. VTX. EFW 3800g extrapolated from  sono. Continuous EFM. Sono. No concerns.  4. Prenatal issues: unstable lie now cephalic  5. GBS neg  6. Pain: IV pain meds/epidural PRN    Plan per attending physician, Dr. Maude Eduardo PGY1 Assessment  28y  at 39w0d presents for rrIOL for unstable lie.     Plan  1. Admit to L+D. Routine Labs. IVF.  2. IOL with buccal cytotec  3. Fetus: cat 1 tracing. VTX. EFW 3800g extrapolated from  sono. Continuous EFM. Sono. No concerns.  4. Prenatal issues: unstable lie now cephalic  5. GBS neg  6. Pain: IV pain meds/epidural PRN    Plan per attending physician, Dr. Maude Eduardo PGY1

## 2024-09-05 NOTE — OB PROVIDER H&P - ATTENDING COMMENTS
OB Attending Note    Agree w/ above.  P1 elective IOL, hx of unstable lie.   For buccal cytotec x 1-2 doses followed by pitocin.   NST category 1.     PRERNA Santoro MD

## 2024-09-06 ENCOUNTER — TRANSCRIPTION ENCOUNTER (OUTPATIENT)
Age: 29
End: 2024-09-06

## 2024-09-06 LAB — T PALLIDUM AB TITR SER: NEGATIVE — SIGNIFICANT CHANGE UP

## 2024-09-06 RX ORDER — OXYCODONE HYDROCHLORIDE 5 MG/1
5 TABLET ORAL
Refills: 0 | Status: DISCONTINUED | OUTPATIENT
Start: 2024-09-06 | End: 2024-09-08

## 2024-09-06 RX ORDER — HYDROCORTISONE 1 %
1 OINTMENT (GRAM) TOPICAL EVERY 6 HOURS
Refills: 0 | Status: DISCONTINUED | OUTPATIENT
Start: 2024-09-06 | End: 2024-09-08

## 2024-09-06 RX ORDER — SODIUM CHLORIDE 9 MG/ML
300 INJECTION INTRAMUSCULAR; INTRAVENOUS; SUBCUTANEOUS ONCE
Refills: 0 | Status: COMPLETED | OUTPATIENT
Start: 2024-09-06 | End: 2024-09-06

## 2024-09-06 RX ORDER — ACETAMINOPHEN 325 MG/1
975 TABLET ORAL
Refills: 0 | Status: DISCONTINUED | OUTPATIENT
Start: 2024-09-06 | End: 2024-09-08

## 2024-09-06 RX ORDER — OXYCODONE HYDROCHLORIDE 5 MG/1
5 TABLET ORAL ONCE
Refills: 0 | Status: DISCONTINUED | OUTPATIENT
Start: 2024-09-06 | End: 2024-09-08

## 2024-09-06 RX ORDER — IBUPROFEN 600 MG
600 TABLET ORAL EVERY 6 HOURS
Refills: 0 | Status: COMPLETED | OUTPATIENT
Start: 2024-09-06 | End: 2025-08-05

## 2024-09-06 RX ORDER — TETANUS TOXOID, REDUCED DIPHTHERIA TOXOID AND ACELLULAR PERTUSSIS VACCINE, ADSORBED 5; 2.5; 8; 8; 2.5 [IU]/.5ML; [IU]/.5ML; UG/.5ML; UG/.5ML; UG/.5ML
0.5 SUSPENSION INTRAMUSCULAR ONCE
Refills: 0 | Status: DISCONTINUED | OUTPATIENT
Start: 2024-09-06 | End: 2024-09-08

## 2024-09-06 RX ORDER — PRAMOXINE HCL 1 %
1 GEL (GRAM) TOPICAL EVERY 4 HOURS
Refills: 0 | Status: DISCONTINUED | OUTPATIENT
Start: 2024-09-06 | End: 2024-09-08

## 2024-09-06 RX ORDER — OXYTOCIN 10 UNIT/ML
2 AMPUL (ML) INJECTION
Qty: 30 | Refills: 0 | Status: DISCONTINUED | OUTPATIENT
Start: 2024-09-06 | End: 2024-09-07

## 2024-09-06 RX ORDER — IBUPROFEN 600 MG
600 TABLET ORAL EVERY 6 HOURS
Refills: 0 | Status: DISCONTINUED | OUTPATIENT
Start: 2024-09-06 | End: 2024-09-08

## 2024-09-06 RX ORDER — OXYTOCIN 10 UNIT/ML
41.67 AMPUL (ML) INJECTION
Qty: 20 | Refills: 0 | Status: DISCONTINUED | OUTPATIENT
Start: 2024-09-06 | End: 2024-09-07

## 2024-09-06 RX ORDER — SODIUM CHLORIDE 9 MG/ML
1000 INJECTION INTRAMUSCULAR; INTRAVENOUS; SUBCUTANEOUS
Refills: 0 | Status: DISCONTINUED | OUTPATIENT
Start: 2024-09-06 | End: 2024-09-07

## 2024-09-06 RX ORDER — KETOROLAC TROMETHAMINE 30 MG/ML
30 INJECTION, SOLUTION INTRAMUSCULAR ONCE
Refills: 0 | Status: DISCONTINUED | OUTPATIENT
Start: 2024-09-06 | End: 2024-09-06

## 2024-09-06 RX ORDER — SODIUM CHLORIDE 9 MG/ML
3 INJECTION INTRAMUSCULAR; INTRAVENOUS; SUBCUTANEOUS EVERY 8 HOURS
Refills: 0 | Status: DISCONTINUED | OUTPATIENT
Start: 2024-09-06 | End: 2024-09-08

## 2024-09-06 RX ORDER — DIPHENHYDRAMINE HCL 50 MG
25 CAPSULE ORAL EVERY 6 HOURS
Refills: 0 | Status: DISCONTINUED | OUTPATIENT
Start: 2024-09-06 | End: 2024-09-08

## 2024-09-06 RX ORDER — VITAMIN A, ASCORBIC ACID, VITAMIN D, .ALPHA.-TOCOPHEROL, THIAMINE MONONITRATE, RIBOFLAVIN, NIACIN, PYRIDOXINE HYDROCHLORIDE, FOLIC ACID, CYANOCOBALAMIN, CALCIUM, IRON, MAGNESIUM, ZINC, AND COPPER 2700; 70; 400; 30; 1.6; 1.8; 18; 2.5; 1; 12; 100; 65; 25; 25; 2 [IU]/1; MG/1; [IU]/1; [IU]/1; MG/1; MG/1; MG/1; MG/1; MG/1; UG/1; MG/1; MG/1; MG/1; MG/1; MG/1
1 TABLET ORAL DAILY
Refills: 0 | Status: DISCONTINUED | OUTPATIENT
Start: 2024-09-06 | End: 2024-09-08

## 2024-09-06 RX ORDER — WITCH HAZEL 1 G/ML
1 LIQUID TOPICAL EVERY 4 HOURS
Refills: 0 | Status: DISCONTINUED | OUTPATIENT
Start: 2024-09-06 | End: 2024-09-08

## 2024-09-06 RX ORDER — LANOLIN
1 OINTMENT (GRAM) TOPICAL EVERY 6 HOURS
Refills: 0 | Status: DISCONTINUED | OUTPATIENT
Start: 2024-09-06 | End: 2024-09-08

## 2024-09-06 RX ORDER — MENTHOL/CETYLPYRD CL 3 MG
1 LOZENGE MUCOUS MEMBRANE EVERY 6 HOURS
Refills: 0 | Status: DISCONTINUED | OUTPATIENT
Start: 2024-09-06 | End: 2024-09-08

## 2024-09-06 RX ADMIN — ACETAMINOPHEN 975 MILLIGRAM(S): 325 TABLET ORAL at 20:36

## 2024-09-06 RX ADMIN — SODIUM CHLORIDE 3 MILLILITER(S): 9 INJECTION INTRAMUSCULAR; INTRAVENOUS; SUBCUTANEOUS at 22:00

## 2024-09-06 RX ADMIN — ACETAMINOPHEN 975 MILLIGRAM(S): 325 TABLET ORAL at 21:06

## 2024-09-06 RX ADMIN — Medication 600 MILLIGRAM(S): at 23:21

## 2024-09-06 RX ADMIN — Medication 125 MILLIUNIT(S)/MIN: at 10:51

## 2024-09-06 RX ADMIN — Medication 600 MILLIGRAM(S): at 18:00

## 2024-09-06 RX ADMIN — SODIUM CHLORIDE 125 MILLILITER(S): 9 INJECTION INTRAMUSCULAR; INTRAVENOUS; SUBCUTANEOUS at 08:30

## 2024-09-06 RX ADMIN — SODIUM CHLORIDE 300 MILLILITER(S): 9 INJECTION INTRAMUSCULAR; INTRAVENOUS; SUBCUTANEOUS at 08:00

## 2024-09-06 RX ADMIN — Medication 2 MILLIUNIT(S)/MIN: at 07:17

## 2024-09-06 RX ADMIN — Medication 600 MILLIGRAM(S): at 23:51

## 2024-09-06 RX ADMIN — Medication 600 MILLIGRAM(S): at 18:27

## 2024-09-06 RX ADMIN — KETOROLAC TROMETHAMINE 30 MILLIGRAM(S): 30 INJECTION, SOLUTION INTRAMUSCULAR at 13:15

## 2024-09-06 RX ADMIN — Medication 125 MILLILITER(S): at 07:18

## 2024-09-06 RX ADMIN — KETOROLAC TROMETHAMINE 30 MILLIGRAM(S): 30 INJECTION, SOLUTION INTRAMUSCULAR at 12:50

## 2024-09-06 RX ADMIN — SODIUM CHLORIDE 3 MILLILITER(S): 9 INJECTION INTRAMUSCULAR; INTRAVENOUS; SUBCUTANEOUS at 14:00

## 2024-09-06 NOTE — DISCHARGE NOTE OB - CARE PLAN
1 Principal Discharge DX:	Normal vaginal delivery  Assessment and plan of treatment:	nothing in the vagina x 6 weeks   Principal Discharge DX:	Normal vaginal delivery  Assessment and plan of treatment:	After discharge, please stay on pelvic rest for 6 weeks, meaning no sexual intercourse, no tampons and no douching.  No driving for 2 weeks.  No lifting objects heavier than baby for 2 weeks.  Expect to have vaginal bleeding/spotting for up to six weeks.  The bleeding should get lighter and more white/light brown with time.  For bleeding soaking more than a pad an hour or passing clots greater than the size of your fist, come in to the   emergency department.  Follow up in the office in 6 weeks

## 2024-09-06 NOTE — OB PROVIDER DELIVERY SUMMARY - NSPROVIDERDELIVERYNOTE_OBGYN_ALL_OB_FT
Attending Note     Pt progressed to 10/100/0   FHTS with bradycardia   pt pushed vertex over intact perineum   tight nuchal cord noted   shoulders and body delivered  vagina, rectum and cervix inspected   1st degree laceration noted and repaired in usual fashion   Male        R Gonzales LEVI

## 2024-09-06 NOTE — DISCHARGE NOTE OB - MEDICATION SUMMARY - MEDICATIONS TO TAKE
I will START or STAY ON the medications listed below when I get home from the hospital:    ibuprofen 600 mg oral tablet  -- 1 tab(s) by mouth every 6 hours as needed for  moderate pain  -- Indication: For Moderate pain    acetaminophen 325 mg oral tablet  -- 3 tab(s) by mouth every 6 hours as needed for  mild pain  -- Indication: For Mild pain    Prenatal Multivitamins with Folic Acid 1 mg oral tablet  -- 1 tab(s) by mouth once a day  -- Indication: For vitamins

## 2024-09-06 NOTE — OB PROVIDER LABOR PROGRESS NOTE - NS_ADDCERVICALEXAM_OBGYN_ALL_OB
Click to add… pt was at work in [facility] and passed out denies hitting head and has not c/o at this time  pt has been fasting and did not eat since yesterday trying to loose weight  IVL placed and bloods EKG done [place]

## 2024-09-06 NOTE — OB PROVIDER LABOR PROGRESS NOTE - NS_SUBJECTIVE/OBJECTIVE_OBGYN_ALL_OB_FT
Pt seen & examined at bedside for labor progress 2/2 recurrent variable & late decels. Resting comfortably with epidural.       Vital Signs Last 24 Hrs  T(C): 36.6 (06 Sep 2024 07:30), Max: 37.3 (05 Sep 2024 23:33)  T(F): 97.88 (06 Sep 2024 07:30), Max: 99.14 (05 Sep 2024 23:33)  HR: 72 (06 Sep 2024 08:08) (57 - 100)  BP: 121/81 (06 Sep 2024 08:08) (100/58 - 144/80)  BP(mean): --  RR: 18 (06 Sep 2024 07:30) (15 - 18)  SpO2: 100% (06 Sep 2024 08:03) (97% - 100%)        FHR  125, moderate variability, +accels, +recurrent variable & late decels, cat 2  TOCO: Q2-3min  VE: 5/60/-3, posterior, soft  clear fluid PV  IUPC placed in usual fashion. Pt tolerated procedure well.

## 2024-09-06 NOTE — OB NEONATOLOGY/PEDIATRICIAN DELIVERY SUMMARY - NSPEDSNEONOTESA_OBGYN_ALL_OB_FT
Peds called to LDR for NRFHR. Baby is a 39.1 wk AGA male born via  to a 29 y/o  mother.  Maternal medical/surgical/pregnancy history of previous postpartum depression. Maternal labs include Blood Type O+ , HIV - , Syphilis screen - , Rubella I , Hep B - , GBS - on 8/15. ROM with clear fluids (ROM hours: approx 2 hrs). Highest maternal temp: 99.2 F. EOS 0.13 . Baby emerged vigorous, crying, was warmed, dried, suctioned, and stimulated with APGARS of 9/9. Resuscitation included: bulb suction and stimulation. Mom plans to initiate breastfeeding, consents Hep B vaccine and consents circ.    : 2024  TOB: 9:16  BW: 3460g     Physical Exam (Post-Delivery)  Gen: NAD; well-appearing  HEENT: NC/AT; anterior fontanelle open and flat; ears and nose clinically patent, normally set; no tags, no cleft palate appreciated  Skin: pink, warm, well-perfused, no rash  Resp: non-labored breathing  Abd: soft, NT/ND; no masses appreciated, umbilical cord with 3 vessels  Extremities: moving all extremities, no crepitus;   MSK: no clavicular fracture appreciated  : Reagan I; no abnormalities; anus patent  Back: no sacral dimple  Neuro: +beba, +babinski, grasp, good tone throughout

## 2024-09-06 NOTE — OB PROVIDER DELIVERY SUMMARY - NSVAGINALEXAMCERT_OBGYN_ALL_OB
The Delivery OB Provider certifies that vaginal examination and/or abdominal examination after the delivery was done and no foreign body was found. expectoration

## 2024-09-06 NOTE — OB NEONATOLOGY/PEDIATRICIAN DELIVERY SUMMARY - BABY A: APGAR 1 MIN COLOR, DELIVERY
Detail Level: Detailed (1) body pink, extremities blue Quality 265: Biopsy Follow-Up: Biopsy results reviewed, communicated, tracked, and documented

## 2024-09-06 NOTE — OB RN DELIVERY SUMMARY - NS_SEPSISRSKCALC_OBGYN_ALL_OB_FT
EOS calculated successfully. EOS Risk Factor: 0.5/1000 live births ([facility] national incidence); GA=39w1d; Temp=99.14; ROM=1.717; GBS='Negative'; Antibiotics='No antibiotics or any antibiotics < 2 hrs prior to birth'

## 2024-09-06 NOTE — OB PROVIDER DELIVERY SUMMARY - NSSELHIDDEN_OBGYN_ALL_OB_FT
[NS_DeliveryAttending1_OBGYN_ALL_OB_FT:[id number]=],[NS_DeliveryAssist1_OBGYN_ALL_OB_FT:[id number]=]

## 2024-09-06 NOTE — DISCHARGE NOTE OB - MEDICATION SUMMARY - MEDICATIONS TO CHANGE
I will SWITCH the dose or number of times a day I take the medications listed below when I get home from the hospital:    acetaminophen 325 mg oral tablet  -- 2 tab(s) by mouth every 4 hours, As Needed

## 2024-09-06 NOTE — OB PROVIDER LABOR PROGRESS NOTE - ASSESSMENT
29y/o  at 39+1wks eIOL with cat 2 tracing in the setting of moderate variability    plan  -start amnioinfusion  -EFM/IUPC/IV fluids  -pitocin paused, will restart when cat 1 and then titrate to adequate MVUs  -repositioning    Discussed w/ Dr Gonzales NEGRETE

## 2024-09-06 NOTE — OB PROVIDER DELIVERY SUMMARY - NSLOWPPHRISK_OBGYN_A_OB
No previous uterine incision/[place] Pregnancy/Less than or equal to 4 previous vaginal births/No known bleeding disorder/No history of postpartum hemorrhage

## 2024-09-06 NOTE — DISCHARGE NOTE OB - CARE PROVIDER_API CALL
Ly Ferreira  Obstetrics and Gynecology  [address] [facility], [address]  [place] [address]  Phone: [phone]  Fax: [phone]  Follow Up Time:

## 2024-09-06 NOTE — DISCHARGE NOTE OB - MEDICATION SUMMARY - MEDICATIONS TO STOP TAKING
I will STOP taking the medications listed below when I get home from the hospital:    oseltamivir 75 mg oral capsule  -- 1 cap(s) by mouth 2 times a day MDD:2

## 2024-09-06 NOTE — OB RN DELIVERY SUMMARY - NSSELHIDDEN_OBGYN_ALL_OB_FT
[NS_DeliveryAttending1_OBGYN_ALL_OB_FT:[email or web address]=],[NS_DeliveryAssist1_OBGYN_ALL_OB_FT:[id number]=],[NS_DeliveryRN_OBGYN_ALL_OB_FT:[email or web address]=],[NS_CirculateRN2_OBGYN_ALL_OB_FT:[id number]=]

## 2024-09-06 NOTE — OB RN DELIVERY SUMMARY - NS_LABORCHARACTER_OBGYN_ALL_OB
Induction of labor-AROM/Internal electronic FM/External electronic FM Induction of labor-AROM/Induction of labor-Medicinal/Augmentation of labor/External electronic FM

## 2024-09-06 NOTE — DISCHARGE NOTE OB - PATIENT PORTAL LINK FT
You can access the FollowMyHealth Patient Portal offered by [facility] by registering at the following website: [email or web address]. By joining [employer]’s FollowMyHealth portal, you will also be able to view your health information using other applications (apps) compatible with our system.

## 2024-09-07 RX ADMIN — Medication 600 MILLIGRAM(S): at 06:30

## 2024-09-07 RX ADMIN — Medication 600 MILLIGRAM(S): at 22:00

## 2024-09-07 RX ADMIN — Medication 600 MILLIGRAM(S): at 21:32

## 2024-09-07 RX ADMIN — ACETAMINOPHEN 975 MILLIGRAM(S): 325 TABLET ORAL at 17:04

## 2024-09-07 RX ADMIN — ACETAMINOPHEN 975 MILLIGRAM(S): 325 TABLET ORAL at 18:00

## 2024-09-07 RX ADMIN — ACETAMINOPHEN 975 MILLIGRAM(S): 325 TABLET ORAL at 09:25

## 2024-09-07 RX ADMIN — SODIUM CHLORIDE 3 MILLILITER(S): 9 INJECTION INTRAMUSCULAR; INTRAVENOUS; SUBCUTANEOUS at 06:11

## 2024-09-07 RX ADMIN — SODIUM CHLORIDE 3 MILLILITER(S): 9 INJECTION INTRAMUSCULAR; INTRAVENOUS; SUBCUTANEOUS at 14:09

## 2024-09-07 RX ADMIN — Medication 30 MILLILITER(S): at 21:31

## 2024-09-07 RX ADMIN — VITAMIN A, ASCORBIC ACID, VITAMIN D, .ALPHA.-TOCOPHEROL, THIAMINE MONONITRATE, RIBOFLAVIN, NIACIN, PYRIDOXINE HYDROCHLORIDE, FOLIC ACID, CYANOCOBALAMIN, CALCIUM, IRON, MAGNESIUM, ZINC, AND COPPER 1 TABLET(S): 2700; 70; 400; 30; 1.6; 1.8; 18; 2.5; 1; 12; 100; 65; 25; 25; 2 TABLET ORAL at 17:01

## 2024-09-07 RX ADMIN — Medication 600 MILLIGRAM(S): at 06:00

## 2024-09-07 RX ADMIN — ACETAMINOPHEN 975 MILLIGRAM(S): 325 TABLET ORAL at 03:19

## 2024-09-07 RX ADMIN — Medication 600 MILLIGRAM(S): at 15:15

## 2024-09-07 RX ADMIN — Medication 600 MILLIGRAM(S): at 14:30

## 2024-09-07 RX ADMIN — ACETAMINOPHEN 975 MILLIGRAM(S): 325 TABLET ORAL at 10:10

## 2024-09-07 RX ADMIN — ACETAMINOPHEN 975 MILLIGRAM(S): 325 TABLET ORAL at 02:49

## 2024-09-07 NOTE — PROGRESS NOTE ADULT - SUBJECTIVE AND OBJECTIVE BOX
S: Patient doing well.   Pain controlled.  +OOB.  +void.  Tolerating PO.  Lochia WNL.    O: Vital Signs Last 24 Hrs  T(C): 36.4 (07 Sep 2024 06:22), Max: 36.7 (06 Sep 2024 17:52)  T(F): 97.6 (07 Sep 2024 06:22), Max: 98 (06 Sep 2024 17:52)  HR: 72 (07 Sep 2024 06:22) (72 - 89)  BP: 110/66 (07 Sep 2024 06:22) (109/56 - 123/74)  BP(mean): --  RR: 16 (07 Sep 2024 06:22) (16 - 18)  SpO2: 98% (07 Sep 2024 06:22) (97% - 99%)    Parameters below as of 07 Sep 2024 06:22  Patient On (Oxygen Delivery Method): room air        Gen: NAD  Abd: soft, NT, ND.   fundus firm below umbilicus, NT.  Ext: no tenderness B/L, negative Karen's sign    Labs:                        9.8    6.44  )-----------( 187      ( 05 Sep 2024 19:35 )             31.3       ABO Interpretation: O ( @ 19:47)    Rh Interpretation: Positive ( @ 19:47)    Antibody Screen Negative            A: 28y PPD#1 s/p  doing well.  - asymptomatic of chronic anemia.   -Continue routine postpartum care.  -Discharge planning.     MD Evy

## 2024-09-08 VITALS
DIASTOLIC BLOOD PRESSURE: 68 MMHG | TEMPERATURE: 98 F | SYSTOLIC BLOOD PRESSURE: 114 MMHG | OXYGEN SATURATION: 98 % | HEART RATE: 82 BPM | RESPIRATION RATE: 20 BRPM

## 2024-09-08 RX ORDER — ACETAMINOPHEN 325 MG/1
3 TABLET ORAL
Qty: 0 | Refills: 0 | DISCHARGE
Start: 2024-09-08

## 2024-09-08 RX ORDER — VITAMIN A, ASCORBIC ACID, VITAMIN D, .ALPHA.-TOCOPHEROL, THIAMINE MONONITRATE, RIBOFLAVIN, NIACIN, PYRIDOXINE HYDROCHLORIDE, FOLIC ACID, CYANOCOBALAMIN, CALCIUM, IRON, MAGNESIUM, ZINC, AND COPPER 2700; 70; 400; 30; 1.6; 1.8; 18; 2.5; 1; 12; 100; 65; 25; 25; 2 [IU]/1; MG/1; [IU]/1; [IU]/1; MG/1; MG/1; MG/1; MG/1; MG/1; UG/1; MG/1; MG/1; MG/1; MG/1; MG/1
1 TABLET ORAL
Qty: 0 | Refills: 0 | DISCHARGE
Start: 2024-09-08

## 2024-09-08 RX ORDER — IBUPROFEN 600 MG
1 TABLET ORAL
Qty: 0 | Refills: 0 | DISCHARGE
Start: 2024-09-08

## 2024-09-08 RX ADMIN — Medication 600 MILLIGRAM(S): at 06:15

## 2024-09-08 RX ADMIN — Medication 600 MILLIGRAM(S): at 06:45

## 2024-09-08 RX ADMIN — SODIUM CHLORIDE 3 MILLILITER(S): 9 INJECTION INTRAMUSCULAR; INTRAVENOUS; SUBCUTANEOUS at 06:11

## 2024-10-15 NOTE — DISCHARGE NOTE OB - BRAND OF COVID-19 VACCINATION
Spoke to Mrs Johnson she said the famotidine was not working so she decide to  give the pantoprazole 40 mg another try and is work. She would like a prescription sent in for the pantoprazole sent to the pharmacy   Pfizer dose 1 and 2

## 2024-10-20 NOTE — OB RN DELIVERY SUMMARY - NSVAGDELIVERYA_OBGYN_ALL_OB
Pt denies melena, hematemsis, coffee ground emesis  - EGD in June demonstrated healed ulcers  - GI consulted, follow up recs Spontaneous

## 2024-11-24 NOTE — DISCHARGE NOTE OB - PAIN PRESENT
See eval for details. Pt displayed deficits with functional mobility/ transfers, deficits with adl's skills also decrease b ue strength/endurance. Recommendation: MOD VS LOW INTENSITY DEPENDING ON PROGRESS     No

## 2024-12-16 ENCOUNTER — NON-APPOINTMENT (OUTPATIENT)
Age: 29
End: 2024-12-16

## 2025-01-16 NOTE — PROGRESS NOTE ADULT - SUBJECTIVE AND OBJECTIVE BOX
S: Patient doing well. Minimal lochia. Pain controlled.    O: Vital Signs Last 24 Hrs  T(C): 36.4 (26 May 2022 06:19), Max: 36.9 (25 May 2022 14:37)  T(F): 97.6 (26 May 2022 06:19), Max: 98.4 (25 May 2022 14:37)  HR: 85 (26 May 2022 06:19) (76 - 88)  BP: 122/69 (26 May 2022 06:19) (92/54 - 122/69)  BP(mean): --  RR: 18 (26 May 2022 06:19) (18 - 19)  SpO2: 99% (26 May 2022 06:19) (99% - 100%)    Gen: NAD  Abd: soft, NT, ND, fundus firm below umbilicus  Lochia: moderate  Ext: no tenderness    Labs:      A: 26y PPD#2 s/p  doing well. (+) flu no symptoms     Plan: discharge to home C Nav  Madina Rose [FreeTextEntry1] : 44 year  y/o P2 presenting for annual exam -f/u pap and GC/CT -Requisition given for mammogram and breast U/S -Contraception: withdrawal  -PHQ-2  depression screen done - referral to Dr Pillai due to complex ovarian cysts given to patient  - f/u PRN

## 2025-05-21 ENCOUNTER — NON-APPOINTMENT (OUTPATIENT)
Age: 30
End: 2025-05-21

## 2025-05-21 ENCOUNTER — APPOINTMENT (OUTPATIENT)
Dept: OTOLARYNGOLOGY | Facility: CLINIC | Age: 30
End: 2025-05-21
Payer: COMMERCIAL

## 2025-05-21 VITALS — HEIGHT: 61 IN | BODY MASS INDEX: 29.27 KG/M2 | WEIGHT: 155 LBS

## 2025-05-21 DIAGNOSIS — H93.A1 PULSATILE TINNITUS, RIGHT EAR: ICD-10-CM

## 2025-05-21 DIAGNOSIS — Z01.10 ENCOUNTER FOR EXAMINATION OF EARS AND HEARING W/OUT ABNORMAL FINDINGS: ICD-10-CM

## 2025-05-21 PROCEDURE — 99204 OFFICE O/P NEW MOD 45 MIN: CPT | Mod: 25

## 2025-05-21 PROCEDURE — 92567 TYMPANOMETRY: CPT

## 2025-05-21 PROCEDURE — 92557 COMPREHENSIVE HEARING TEST: CPT

## 2025-06-09 NOTE — DISCHARGE NOTE OB - ADDITIONAL INSTRUCTIONS
Wt Readings from Last 3 Encounters:   06/09/25 80.4 kg (177 lb 4 oz)   06/05/25 81.1 kg (178 lb 12.7 oz)   05/04/25 79.2 kg (174 lb 9.7 oz)   Continue management per cardiology colleagues ICU team  Monitor tachycardia while on milrinone   follow up in 6 weeks

## 2025-07-14 ENCOUNTER — APPOINTMENT (OUTPATIENT)
Dept: MRI IMAGING | Facility: CLINIC | Age: 30
End: 2025-07-14

## 2025-07-18 NOTE — OB RN DELIVERY SUMMARY - NS_LABORANALGESIA_OBGYN_ALL_OB
Colonoscopy on 1/24/2024 by Dr. Clements revealed grade 2 internal hemorrhoids and no colon polyps.  A 10-year follow-up was recommended.
Analgesia was given

## 2025-08-20 ENCOUNTER — APPOINTMENT (OUTPATIENT)
Dept: OTOLARYNGOLOGY | Facility: CLINIC | Age: 30
End: 2025-08-20